# Patient Record
Sex: FEMALE | Race: WHITE | NOT HISPANIC OR LATINO | URBAN - METROPOLITAN AREA
[De-identification: names, ages, dates, MRNs, and addresses within clinical notes are randomized per-mention and may not be internally consistent; named-entity substitution may affect disease eponyms.]

---

## 2017-04-12 ENCOUNTER — OUTPATIENT (OUTPATIENT)
Dept: OUTPATIENT SERVICES | Facility: HOSPITAL | Age: 47
LOS: 1 days | Discharge: HOME | End: 2017-04-12

## 2017-06-27 DIAGNOSIS — K31.7 POLYP OF STOMACH AND DUODENUM: ICD-10-CM

## 2017-06-27 DIAGNOSIS — K21.0 GASTRO-ESOPHAGEAL REFLUX DISEASE WITH ESOPHAGITIS: ICD-10-CM

## 2017-06-27 DIAGNOSIS — K21.9 GASTRO-ESOPHAGEAL REFLUX DISEASE WITHOUT ESOPHAGITIS: ICD-10-CM

## 2017-06-27 DIAGNOSIS — K44.9 DIAPHRAGMATIC HERNIA WITHOUT OBSTRUCTION OR GANGRENE: ICD-10-CM

## 2017-06-27 DIAGNOSIS — D86.9 SARCOIDOSIS, UNSPECIFIED: ICD-10-CM

## 2018-09-21 ENCOUNTER — EMERGENCY (EMERGENCY)
Facility: HOSPITAL | Age: 48
LOS: 0 days | Discharge: HOME | End: 2018-09-21
Admitting: PHYSICIAN ASSISTANT

## 2018-09-21 VITALS
RESPIRATION RATE: 18 BRPM | DIASTOLIC BLOOD PRESSURE: 72 MMHG | HEART RATE: 68 BPM | TEMPERATURE: 98 F | SYSTOLIC BLOOD PRESSURE: 128 MMHG | OXYGEN SATURATION: 100 %

## 2018-09-21 DIAGNOSIS — S51.011A LACERATION WITHOUT FOREIGN BODY OF RIGHT ELBOW, INITIAL ENCOUNTER: ICD-10-CM

## 2018-09-21 DIAGNOSIS — S80.211A ABRASION, RIGHT KNEE, INITIAL ENCOUNTER: ICD-10-CM

## 2018-09-21 DIAGNOSIS — Z23 ENCOUNTER FOR IMMUNIZATION: ICD-10-CM

## 2018-09-21 DIAGNOSIS — R07.81 PLEURODYNIA: ICD-10-CM
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
## 2018-09-21 DIAGNOSIS — S49.91XA UNSPECIFIED INJURY OF RIGHT SHOULDER AND UPPER ARM, INITIAL ENCOUNTER: ICD-10-CM

## 2018-09-21 RX ORDER — TETANUS TOXOID, REDUCED DIPHTHERIA TOXOID AND ACELLULAR PERTUSSIS VACCINE, ADSORBED 5; 2.5; 8; 8; 2.5 [IU]/.5ML; [IU]/.5ML; UG/.5ML; UG/.5ML; UG/.5ML
0.5 SUSPENSION INTRAMUSCULAR ONCE
Qty: 0 | Refills: 0 | Status: COMPLETED | OUTPATIENT
Start: 2018-09-21 | End: 2018-09-21

## 2018-09-21 RX ADMIN — TETANUS TOXOID, REDUCED DIPHTHERIA TOXOID AND ACELLULAR PERTUSSIS VACCINE, ADSORBED 0.5 MILLILITER(S): 5; 2.5; 8; 8; 2.5 SUSPENSION INTRAMUSCULAR at 11:09

## 2018-09-21 NOTE — ED PROVIDER NOTE - OBJECTIVE STATEMENT
The patient is a 48y Female presenting after a fall this AM with right shoulder, elbow and knee pain. The patient is a 48y Female presenting after a fall this AM with right shoulder, elbow, rib and knee pain. Patient states she was walking her daughter to school and tripped over some uneven pavement and fell on her right elbow and shoulder. She denies hitting head or LOC. She denies any ankle/back pain, numbness or tingling, swelling, and shortness of breath. She states the pain is worse with movement of her arm. She states she obtained a laceration on elbow. She has not taken any medications or used ice. She is unsure if her tetanus is up to date. She is not on any anticoagulation.

## 2018-09-21 NOTE — ED PROCEDURE NOTE - CPROC ED POST PROC CARE GUIDE1
Instructed pt to follow up with ortho/Elevate the injured extremity as instructed./Instructed patient/caregiver regarding signs and symptoms of infection./Keep the cast/splint/dressing clean and dry./Verbal/written post procedure instructions were given to patient/caregiver.
Verbal/written post procedure instructions were given to patient/caregiver./Instructed patient/caregiver regarding signs and symptoms of infection./Instructed patient/caregiver to follow-up with primary care physician./Keep the cast/splint/dressing clean and dry.

## 2018-09-21 NOTE — ED ADULT NURSE NOTE - OBJECTIVE STATEMENT
pt c/o right shoulder pain s/p tripping on an uneven pavement few mins pta. denies LOC. not taking any anticoagulant.

## 2018-09-21 NOTE — ED PROVIDER NOTE - CARE PLAN
Principal Discharge DX:	Fall  Secondary Diagnosis:	Elbow injury  Secondary Diagnosis:	Shoulder injury

## 2018-09-21 NOTE — ED PROVIDER NOTE - NS ED ROS FT
GEN: (-) fever, (-) chills  HEENT: (-) vision changes, (-) HA  CV: (-) chest pain, (-) palpitations, (-) edema  PULM: (-) wheezing, (-) dyspnea  NEURO: (-) weakness, (-) paresthesias, (-) syncope  MS: (-) back pain, (-) Elbow/Shoulder pain (-)myalgias, (-) swelling  SKIN: (-) rashes, (+) Laceration, (-) ecchymosis

## 2018-09-21 NOTE — ED PROVIDER NOTE - PROGRESS NOTE DETAILS
I have personally evaluated the patient myself and agree with fellow's plan. Posterior splint placed for elbow injury, pt aware that despite radiologist reading as normal XR study was limited due to positioning and she should follow up with ortho this week for repeat XR. Pt also aware that she needs suture removal in 10 days.

## 2018-09-21 NOTE — ED PROVIDER NOTE - PHYSICAL EXAMINATION
GEN: Alert & Oriented x 3, No acute distress. Calm, appropriate.  RESP: Lungs clear to auscult bilat. no wheezes, rhonchi or rales. No retractions. Equal air entry.  CARDIO: regular rate and rhythm, no murmurs, rubs or gallops. Normal S1, S2.  Radial pulses 2+ bilaterally.   MS:  No obvious swelling or deformities. ROM or elbow and shoulder limited due to pain. Full ROM of wrists. Tenderness over olecranon process of right elbow. No point tenderness over right shoulder. No tenderness with palpation of wrist. Full range of motion of lower extremities.  Minimal tenderness to lateral aspect of right knee. No tenderness with palpation of bilateral ankles. No midline tenderness throughout.   SKIN: 1.5cm laceration to right elbow. No foreign body visualized. No ecchymosis. Superficial abrasion to right knee.   NEURO: Strength + sensation intact x 4 extremities.

## 2022-07-09 NOTE — ED PROCEDURE NOTE - NS ED PROCEDURE ASSISTED BY
Pt admitted to floor in stable condition with all personal belongings.   
The procedure was performed independently
Assistance was available

## 2023-05-19 NOTE — ED ADULT TRIAGE NOTE - HEART RATE (BEATS/MIN)
68
PAST MEDICAL HISTORY:  Bifascicular block     Esophageal cancer     GERD (gastroesophageal reflux disease)     H/O hiatal hernia     HTN (hypertension)     MS (multiple sclerosis)     Sinus symptom     Skin cancer